# Patient Record
Sex: MALE | Race: WHITE
[De-identification: names, ages, dates, MRNs, and addresses within clinical notes are randomized per-mention and may not be internally consistent; named-entity substitution may affect disease eponyms.]

---

## 2019-03-11 ENCOUNTER — HOSPITAL ENCOUNTER (OUTPATIENT)
Dept: HOSPITAL 75 - LAB FS | Age: 29
End: 2019-03-11
Attending: FAMILY MEDICINE
Payer: COMMERCIAL

## 2019-03-11 DIAGNOSIS — K92.1: Primary | ICD-10-CM

## 2019-03-11 LAB
ERYTHROCYTE [DISTWIDTH] IN BLOOD BY AUTOMATED COUNT: 12.9 % (ref 10–14.5)
HCT VFR BLD CALC: 46 % (ref 40–54)
HGB BLD-MCNC: 15.1 G/DL (ref 13.3–17.7)
MCH RBC QN AUTO: 30 PG (ref 25–34)
MCHC RBC AUTO-ENTMCNC: 33 G/DL (ref 32–36)
MCV RBC AUTO: 90 FL (ref 80–99)
PLATELET # BLD: 348 10^3/UL (ref 130–400)
PMV BLD AUTO: 10.5 FL (ref 7.4–10.4)
WBC # BLD AUTO: 11.6 10^3/UL (ref 4.3–11)

## 2019-03-11 PROCEDURE — 85027 COMPLETE CBC AUTOMATED: CPT

## 2019-03-11 PROCEDURE — 36415 COLL VENOUS BLD VENIPUNCTURE: CPT

## 2020-12-14 ENCOUNTER — HOSPITAL ENCOUNTER (OUTPATIENT)
Dept: HOSPITAL 75 - LAB | Age: 30
LOS: 90 days | Discharge: HOME | End: 2021-03-14
Attending: UROLOGY
Payer: COMMERCIAL

## 2020-12-14 DIAGNOSIS — Z01.89: Primary | ICD-10-CM

## 2020-12-14 DIAGNOSIS — Z98.52: ICD-10-CM

## 2020-12-14 LAB — SPERM P VAS SMN QL MICRO: PRESENT

## 2020-12-14 PROCEDURE — 89321 SEMEN ANAL SPERM DETECTION: CPT

## 2022-07-15 ENCOUNTER — HOSPITAL ENCOUNTER (EMERGENCY)
Dept: HOSPITAL 75 - ER FS | Age: 32
Discharge: HOME | End: 2022-07-15
Payer: COMMERCIAL

## 2022-07-15 VITALS — HEIGHT: 68.9 IN | BODY MASS INDEX: 36.24 KG/M2 | WEIGHT: 244.71 LBS

## 2022-07-15 VITALS — DIASTOLIC BLOOD PRESSURE: 9 MMHG | SYSTOLIC BLOOD PRESSURE: 137 MMHG

## 2022-07-15 DIAGNOSIS — Z28.310: ICD-10-CM

## 2022-07-15 DIAGNOSIS — R07.2: Primary | ICD-10-CM

## 2022-07-15 DIAGNOSIS — F41.1: ICD-10-CM

## 2022-07-15 LAB
ALBUMIN SERPL-MCNC: 4.4 GM/DL (ref 3.2–4.5)
ALP SERPL-CCNC: 102 U/L (ref 40–136)
ALT SERPL-CCNC: 17 U/L (ref 0–55)
BASOPHILS # BLD AUTO: 0 10^3/UL (ref 0–0.1)
BASOPHILS NFR BLD AUTO: 0 % (ref 0–10)
BILIRUB SERPL-MCNC: 0.6 MG/DL (ref 0.1–1)
BUN/CREAT SERPL: 15
CALCIUM SERPL-MCNC: 9 MG/DL (ref 8.5–10.1)
CHLORIDE SERPL-SCNC: 105 MMOL/L (ref 98–107)
CO2 SERPL-SCNC: 22 MMOL/L (ref 21–32)
CREAT SERPL-MCNC: 0.93 MG/DL (ref 0.6–1.3)
EOSINOPHIL # BLD AUTO: 0.3 10^3/UL (ref 0–0.3)
EOSINOPHIL NFR BLD AUTO: 3 % (ref 0–10)
GFR SERPLBLD BASED ON 1.73 SQ M-ARVRAT: 113 ML/MIN
GLUCOSE SERPL-MCNC: 113 MG/DL (ref 70–105)
HCT VFR BLD CALC: 43 % (ref 40–54)
HGB BLD-MCNC: 14.5 G/DL (ref 13.3–17.7)
LIPASE SERPL-CCNC: 32 U/L (ref 8–78)
LYMPHOCYTES # BLD AUTO: 1.8 10^3/UL (ref 1–4)
LYMPHOCYTES NFR BLD AUTO: 19 % (ref 12–44)
MANUAL DIFFERENTIAL PERFORMED BLD QL: NO
MCH RBC QN AUTO: 29 PG (ref 25–34)
MCHC RBC AUTO-ENTMCNC: 34 G/DL (ref 32–36)
MCV RBC AUTO: 86 FL (ref 80–99)
MONOCYTES # BLD AUTO: 1 10^3/UL (ref 0–1)
MONOCYTES NFR BLD AUTO: 11 % (ref 0–12)
NEUTROPHILS # BLD AUTO: 6.2 10^3/UL (ref 1.8–7.8)
NEUTROPHILS NFR BLD AUTO: 66 % (ref 42–75)
PLATELET # BLD: 290 10^3/UL (ref 130–400)
PMV BLD AUTO: 10.3 FL (ref 9–12.2)
POTASSIUM SERPL-SCNC: 4 MMOL/L (ref 3.6–5)
PROT SERPL-MCNC: 6.7 GM/DL (ref 6.4–8.2)
SODIUM SERPL-SCNC: 138 MMOL/L (ref 135–145)
WBC # BLD AUTO: 9.5 10^3/UL (ref 4.3–11)

## 2022-07-15 PROCEDURE — 85025 COMPLETE CBC W/AUTO DIFF WBC: CPT

## 2022-07-15 PROCEDURE — 83690 ASSAY OF LIPASE: CPT

## 2022-07-15 PROCEDURE — 36415 COLL VENOUS BLD VENIPUNCTURE: CPT

## 2022-07-15 PROCEDURE — 84484 ASSAY OF TROPONIN QUANT: CPT

## 2022-07-15 PROCEDURE — 93005 ELECTROCARDIOGRAM TRACING: CPT

## 2022-07-15 PROCEDURE — 71045 X-RAY EXAM CHEST 1 VIEW: CPT

## 2022-07-15 PROCEDURE — 80053 COMPREHEN METABOLIC PANEL: CPT

## 2022-07-15 NOTE — ED CHEST PAIN
General


Chief Complaint:  Cardiac/General Problems


Stated Complaint:  CHEST PAIN


Nursing Triage Note:  


Pt c/o center chest pain since 9pm yesterday. Reports no relief from tums. Pt 


reports he also vomitted yesterday morning. Denies cardiac hx.


Source:  patient


Exam Limitations:  no limitations





History of Present Illness


Date Seen by Provider:  Jul 15, 2022


Time Seen by Provider:  03:29


Initial Comments


31-year-old male patient with history of anxiety complaining of nonexertional 

substernal aching pain that started at 2100 last night as a constant pain that 

getting worse with position.  Patient rated his pain 2- 3/10 and states the pain

getting worse with supine position.  Patient denies radiation of pain, shortness

of breath, diaphoresis, palpitation, paresthesia, nausea.  Patient took Tums 

without improvement of his pain.  Patient denies history of chest pain.  Patient

stated he had sore throat 5 days ago and diagnosed with tonsil stones at urgent 

care and treated with amoxicillin.  Patient complaining of 1 episode of vomiting

yesterday morning while he was at work and several episodes of diarrhea 

yesterday.





Allergies and Home Medications


Allergies


Coded Allergies:  


     No Known Allergies (Verified  Allergy, Unknown, 7/15/22)





Patient Home Medication List


Home Medication List Reviewed:  Yes





Review of Systems


Review of Systems


Constitutional:  see HPI


EENTM:  See HPI


Respiratory:  No Symptoms Reported


Cardiovascular:  See HPI


Gastrointestinal:  See HPI


Genitourinary:  No Symptoms Reported


Musculoskeletal:  no symptoms reported


Skin:  no symptoms reported


Psychiatric/Neurological:  See HPI


Endocrine:  No Symptoms Reported


Hematologic/Lymphatic:  No Symptoms Reported





All Other Systems Reviewed


Negative Unless Noted:  Yes





Past Medical-Social-Family Hx


Patient Social History


Tobacco Use?:  No


Use of E-Cig and/or Vaping dev:  No


Substance use?:  No


Alcohol Use?:  No


Pt feels they are or have been:  No





Immunizations Up To Date


First/Initial COVID19 Vaccinat:  Moderna


Second COVID19 Vaccination Juan:  Moderna





Physical Exam


Vital Signs





Vital Signs - First Documented








 7/15/22





 03:31


 


Temp 36.9


 


Pulse 70


 


Resp 18


 


B/P (MAP) 142/101 (115)


 


Pulse Ox 98


 


O2 Delivery Room Air





Capillary Refill : Less Than 3 Seconds


Height, Weight, BMI


Height: '"


Weight: lbs. oz. kg; 36.00 BMI


Method:


General Appearance:  No Apparent Distress, WD/WN, Anxious


HEENT:  PERRL/EOMI, TMs Normal


Neck:  Full Range of Motion, Normal Inspection


Respiratory:  Chest Non Tender, Lungs Clear, Normal Breath Sounds, No Accessory 

Muscle Use, No Respiratory Distress


Cardiovascular:  Regular Rate, Rhythm, No Edema, No Gallop


Gastrointestinal:  Normal Bowel Sounds, No Organomegaly


Extremity:  Normal Capillary Refill, Normal Inspection


Neurologic/Psychiatric:  Alert, Oriented x3, No Motor/Sensory Deficits


Skin:  Normal Color, Warm/Dry


Lymphatic:  No Adenopathy





Progress/Results/Core Measures


Results/Orders


Lab Results





Laboratory Tests








Test


 7/15/22


03:30 Range/Units


 


 


White Blood Count


 9.5 


 4.3-11.0


10^3/uL


 


Red Blood Count


 5.02 


 4.30-5.52


10^6/uL


 


Hemoglobin 14.5  13.3-17.7  g/dL


 


Hematocrit 43  40-54  %


 


Mean Corpuscular Volume 86  80-99  fL


 


Mean Corpuscular Hemoglobin 29  25-34  pg


 


Mean Corpuscular Hemoglobin


Concent 34 


 32-36  g/dL





 


Red Cell Distribution Width 13.0  10.0-14.5  %


 


Platelet Count


 290 


 130-400


10^3/uL


 


Mean Platelet Volume 10.3  9.0-12.2  fL


 


Immature Granulocyte % (Auto) 0   %


 


Neutrophils (%) (Auto) 66  42-75  %


 


Lymphocytes (%) (Auto) 19  12-44  %


 


Monocytes (%) (Auto) 11  0-12  %


 


Eosinophils (%) (Auto) 3  0-10  %


 


Basophils (%) (Auto) 0  0-10  %


 


Neutrophils # (Auto)


 6.2 


 1.8-7.8


10^3/uL


 


Lymphocytes # (Auto)


 1.8 


 1.0-4.0


10^3/uL


 


Monocytes # (Auto)


 1.0 


 0.0-1.0


10^3/uL


 


Eosinophils # (Auto)


 0.3 


 0.0-0.3


10^3/uL


 


Basophils # (Auto)


 0.0 


 0.0-0.1


10^3/uL


 


Immature Granulocyte # (Auto)


 0.0 


 0.0-0.1


10^3/uL


 


Sodium Level 138  135-145  MMOL/L


 


Potassium Level 4.0  3.6-5.0  MMOL/L


 


Chloride Level 105    MMOL/L


 


Carbon Dioxide Level 22  21-32  MMOL/L


 


Anion Gap 11  5-14  MMOL/L


 


Blood Urea Nitrogen 14  7-18  MG/DL


 


Creatinine


 0.93 


 0.60-1.30


MG/DL


 


Estimat Glomerular Filtration


Rate 113 


  





 


BUN/Creatinine Ratio 15   


 


Glucose Level 113 H   MG/DL


 


Calcium Level 9.0  8.5-10.1  MG/DL


 


Corrected Calcium 8.7  8.5-10.1  MG/DL


 


Total Bilirubin 0.6  0.1-1.0  MG/DL


 


Aspartate Amino Transf


(AST/SGOT) 16 


 5-34  U/L





 


Alanine Aminotransferase


(ALT/SGPT) 17 


 0-55  U/L





 


Alkaline Phosphatase 102    U/L


 


Troponin I < 0.30  <0.30  NG/ML


 


Total Protein 6.7  6.4-8.2  GM/DL


 


Albumin 4.4  3.2-4.5  GM/DL


 


Lipase 32  8-78  U/L








My Orders





Orders - MACKENZIE RAYMOND MD


Cbc With Automated Diff (7/15/22 03:40)


Chest 1 View Ap/Pa Only (7/15/22 03:40)


Ekg Tracing (7/15/22 03:40)


Comprehensive Metabolic Panel (7/15/22 03:40)


Ed Iv/Invasive Line Start (7/15/22 03:40)


Lipase (7/15/22 03:40)


Troponin I Fs (7/15/22 03:40)


Antacid  Suspension (Mylanta  Suspension (7/15/22 03:45)


Lidocaine 2% Viscous 15 Ml (Xylocaine Vi (7/15/22 03:45)


Lidocaine 2% Viscous 15 Ml (Xylocaine Vi (7/15/22 03:48)


Antacid  Suspension (Mylanta  Suspension (7/15/22 03:49)





Medications Given in ED





Current Medications








 Medications  Dose


 Ordered  Sig/Lala


 Route  Start Time


 Stop Time Status Last Admin


Dose Admin


 


 Al Hydrox/Mg


 Hydrox/Simethicone  30 ml  ONCE  ONCE


 PO  7/15/22 03:45


 7/15/22 03:46 DC 7/15/22 03:50


30 ML


 


 Lidocaine HCl  5 ml  ONCE  ONCE


 PO  7/15/22 03:45


 7/15/22 03:46 DC 7/15/22 03:50


15 ML








Vital Signs/I&O











 7/15/22





 03:31


 


Temp 36.9


 


Pulse 70


 


Resp 18


 


B/P (MAP) 142/101 (115)


 


Pulse Ox 98


 


O2 Delivery Room Air














Blood Pressure Mean:                    115











Progress


Progress Note :  


Progress Note


Evaluation patient in ER showed 4-year-old male patient with STONE score of 0 

presented with complaining of substernal chest pain for about 6 hours as a mild 

pain without other symptoms.  Patient had a diagnosis of recent tonsil infection

and treated with amoxicillin.  Patient complaining of several episodes of 

diarrhea and 1 episode of vomiting yesterday morning.  Patient has unremarkable 

physical exam.  CBC and CMP and troponin was unremarkable.  Chest x-ray did not 

show acute finding.  EKG did not show ST elevation.  Patient treated with GI 

cocktail but he states his pain did not change.  Patient did not want to have 

more pain medication and he stated he would take Tylenol at home.  Patient inf

ormed that the chest pain is most likely related to GI symptoms and side effect 

of amoxicillin.  Patient did not want to have pain medication for home.  Patient

advised to follow-up with his primary care physician in 3 to 5 days and return 

to ER as needed.


Initial ECG Impression Date:  Jul 15, 2022


Initial ECG Impression Time:  03:25


Initial ECG Rate:  70


Comment


EKG interpreted by me.  EKG at 0 325 showed normal sinus rhythm at rate of 70 

with multiple artifact, WV interval of 133 and QT interval of 392, possible 

right ventricular conduction delay, no acute ST and T wave elevation.


EKG :  


   EKG Time:  03:25





Departure


Impression





   Primary Impression:  


   Non-cardiac chest pain


   Additional Impression:  


   Anxiety about health


Disposition:  01 HOME, SELF-CARE


Condition:  Stable





Departure-Patient Inst.


Decision time for Depature:  04:22


Referrals:  


NO,LOCAL PHYSICIAN (PCP/Family)


Primary Care Physician


Patient Instructions:  Chest Pain That Is Not Caused by the Heart (DC)





Add. Discharge Instructions:  


Follow-up with your primary care physician in 3 to 5 days


Return to ER as needed


May take Tylenol or ibuprofen as needed for pain





All discharge instructions reviewed with patient and/or family. Voiced 

understanding.











MACKENZIE RAYMOND MD             Jul 15, 2022 04:13

## 2022-07-15 NOTE — DIAGNOSTIC IMAGING REPORT
EXAMINATION: Chest 1 view



HISTORY: Chest pain



COMPARISON: None available.



FINDINGS: 



Heart size and pulmonary vasculature are normal. The lungs are

clear without consolidation, pleural effusion, or pneumothorax.

The osseous structures are intact.



IMPRESSION: 



1. No acute radiographic abnormality in the chest.



Dictated by: 



  Dictated on workstation # [id number]

## 2023-10-25 ENCOUNTER — HOSPITAL ENCOUNTER (EMERGENCY)
Dept: HOSPITAL 75 - ER FS | Age: 33
Discharge: HOME | End: 2023-10-25
Payer: COMMERCIAL

## 2023-10-25 VITALS — WEIGHT: 253.53 LBS | BODY MASS INDEX: 37.55 KG/M2 | HEIGHT: 68.9 IN

## 2023-10-25 VITALS — SYSTOLIC BLOOD PRESSURE: 132 MMHG | DIASTOLIC BLOOD PRESSURE: 89 MMHG

## 2023-10-25 DIAGNOSIS — N13.2: Primary | ICD-10-CM

## 2023-10-25 LAB
ALBUMIN SERPL-MCNC: 4.6 GM/DL (ref 3.2–4.5)
ALP SERPL-CCNC: 98 U/L (ref 40–136)
ALT SERPL-CCNC: 19 U/L (ref 0–55)
APTT PPP: YELLOW S
BACTERIA #/AREA URNS HPF: NEGATIVE /HPF
BASOPHILS # BLD AUTO: 0.1 10^3/UL (ref 0–0.1)
BASOPHILS NFR BLD AUTO: 1 % (ref 0–10)
BILIRUB SERPL-MCNC: 0.4 MG/DL (ref 0.1–1)
BILIRUB UR QL STRIP: (no result)
BUN/CREAT SERPL: 13
CALCIUM SERPL-MCNC: 9.3 MG/DL (ref 8.5–10.1)
CHLORIDE SERPL-SCNC: 104 MMOL/L (ref 98–107)
CO2 SERPL-SCNC: 23 MMOL/L (ref 21–32)
CREAT SERPL-MCNC: 1.2 MG/DL (ref 0.6–1.3)
EOSINOPHIL # BLD AUTO: 0.2 10^3/UL (ref 0–0.3)
EOSINOPHIL NFR BLD AUTO: 2 % (ref 0–10)
FIBRINOGEN PPP-MCNC: (no result) MG/DL
GFR SERPLBLD BASED ON 1.73 SQ M-ARVRAT: 82 ML/MIN
GLUCOSE SERPL-MCNC: 142 MG/DL (ref 70–105)
GLUCOSE UR STRIP-MCNC: NEGATIVE MG/DL
HCT VFR BLD CALC: 43 % (ref 40–54)
HGB BLD-MCNC: 14.2 G/DL (ref 13.3–17.7)
KETONES UR QL STRIP: NEGATIVE
LEUKOCYTE ESTERASE UR QL STRIP: NEGATIVE
LIPASE SERPL-CCNC: 25 U/L (ref 8–78)
LYMPHOCYTES # BLD AUTO: 2.6 10^3/UL (ref 1–4)
LYMPHOCYTES NFR BLD AUTO: 22 % (ref 12–44)
MANUAL DIFFERENTIAL PERFORMED BLD QL: NO
MCH RBC QN AUTO: 29 PG (ref 25–34)
MCHC RBC AUTO-ENTMCNC: 33 G/DL (ref 32–36)
MCV RBC AUTO: 89 FL (ref 80–99)
MONOCYTES # BLD AUTO: 0.6 10^3/UL (ref 0–1)
MONOCYTES NFR BLD AUTO: 5 % (ref 0–12)
NEUTROPHILS # BLD AUTO: 8 10^3/UL (ref 1.8–7.8)
NEUTROPHILS NFR BLD AUTO: 70 % (ref 42–75)
NITRITE UR QL STRIP: NEGATIVE
PH UR STRIP: 5.5 [PH] (ref 5–9)
PLATELET # BLD: 311 10^3/UL (ref 130–400)
PMV BLD AUTO: 9.9 FL (ref 9–12.2)
POTASSIUM SERPL-SCNC: 4.3 MMOL/L (ref 3.6–5)
PROT SERPL-MCNC: 7.2 GM/DL (ref 6.4–8.2)
PROT UR QL STRIP: (no result)
SODIUM SERPL-SCNC: 139 MMOL/L (ref 135–145)
SP GR UR STRIP: >=1.03 (ref 1.02–1.02)
SQUAMOUS #/AREA URNS HPF: (no result) /HPF
WBC # BLD AUTO: 11.5 10^3/UL (ref 4.3–11)
WBC #/AREA URNS HPF: (no result) /HPF

## 2023-10-25 PROCEDURE — 83690 ASSAY OF LIPASE: CPT

## 2023-10-25 PROCEDURE — 80053 COMPREHEN METABOLIC PANEL: CPT

## 2023-10-25 PROCEDURE — 74176 CT ABD & PELVIS W/O CONTRAST: CPT

## 2023-10-25 PROCEDURE — 85025 COMPLETE CBC W/AUTO DIFF WBC: CPT

## 2023-10-25 PROCEDURE — 81000 URINALYSIS NONAUTO W/SCOPE: CPT

## 2023-10-25 PROCEDURE — 36415 COLL VENOUS BLD VENIPUNCTURE: CPT

## 2023-10-25 NOTE — DIAGNOSTIC IMAGING REPORT
PROCEDURE: CT abdomen and pelvis without contrast.



TECHNIQUE: Multiple contiguous axial images were obtained through

the abdomen and pelvis without the use of intravenous contrast.

Auto Exposure Controls were utilized during the CT exam to meet

ALARA standards for radiation dose reduction. 



INDICATION:  Right-sided abdominal pain, renal stones



COMPARISON: None available 



FINDINGS: The visualized lung bases are clear. The unenhanced

liver and spleen are unremarkable. The adrenal glands are

unremarkable. The pancreas is unremarkable. The gallbladder is

unremarkable. Punctate 1 mm nonobstructing left renal calculus.

Otherwise, the left kidney and left ureter are unremarkable. A 3

mm calculus is identified within the right ureterovesicular

junction resulting in mild right hydroureteronephrosis.

Additional small calculus noted within the inferior pole of the

right kidney. No aneurysmal dilatation of the abdominal aorta.

Tiny fat-containing umbilical hernia. The urinary bladder is

decompressed, therefore not well evaluated. Small fat-containing

bilateral inguinal hernias. Surgical clips within the

scrotum/inguinal canals bilaterally. Prior appendectomy. No bowel

obstruction or pneumatosis. No significant adenopathy, free air,

or free fluid within the abdomen or pelvis. No acute osseous

abnormality.



IMPRESSION: 3 mm stone within the right ureterovesicular junction

resulting in mild right hydroureteronephrosis.



Tiny bilateral renal calculi, nonobstructing on the left.



Small fat-containing bilateral inguinal hernias and umbilical

hernia.



Additional findings as above.



Dictated by: 



  Dictated on workstation # [id number]

## 2023-10-25 NOTE — ED ABDOMINAL PAIN
General


Chief Complaint:  Abdominal/GI Problems


Stated Complaint:  ABD PAIN|RIGHT SIDE


Nursing Triage Note:  


ARRIVED VIA AMB TO ROOM 06 WITH COMPLAINTS OF RIGHT MID/SIDE ABD PAIN STARTING 


AT 0400.  PT ALSO COMPLAINS OF VOMITING X2.


Source of Information:  Patient





History of Present Illness


Date Seen by Provider:  Oct 25, 2023


Time Seen by Provider:  07:31


Initial Comments


33-year-old male presenting with complaints of sudden onset of right flank pain.

 He states that he woke up at 4 AM with the pain.  He has had 2 episodes of 

vomiting with the pain.  He has not taken anything for the pain because of the 

nausea and vomiting.  He had presented to urgent care and they had told him that

they could not do anything for him and to go to the ER.  He states he does have 

a history of kidney stone on the left side but that it was causing him pain more

in his back and this felt different.  He denied any fever, chills, pain with 

urination.  He has had no change in his bowels.


Timing/Duration:  1-3 Hours


Severity/Quality:  Severe, Sharp, Stabbing


Location:  Flank (Right side of his flank)


Activities at Onset:  Sleeping


Modifying Factors:  Worsens With Movement, Worsens With Palpation


Associated Symptoms:  No Back Pain, No Chest Pain, No Diaphoresis, No 

Fever/Chills, No Fatigue, No Headache, No Heartburn; Nausea/Vomiting; No Rash, 

No Shortness of Air, No Swelling/Mass in Abdomen, No Syncope, No Weakness





Allergies and Home Medications


Allergies


Coded Allergies:  


     No Known Allergies (Verified  Allergy, Unknown, 7/15/22)





Patient Home Medication List


Home Medication List Reviewed:  Yes


Hydrocodone/Acetaminophen (Hydrocodone-Acetamin 5-325 mg) 5 Mg-325 Mg Tablet, 1 

TAB PO Q4H PRN for PAIN SEVERE


   Prescribed by: CRISTIAN ALBERTORT on 10/25/23 0839


Ondansetron (Ondansetron Odt) 4 Mg Tab.rapdis, 4 MG PO Q6H PRN for 

NAUSEA/VOMITING


   Prescribed by: CRISTIAN ALBERTORT on 10/25/23 08


Tamsulosin HCl (Flomax) 0.4 Mg Cap, 0.4 MG PO DAILY


   Prescribed by: CRISTIAN ALBERTORT on 10/25/23 0838





Review of Systems


Review of Systems


Constitutional:  No chills, No fever


EENTM:  No Symptoms Reported


Respiratory:  No Symptoms Reported


Cardiovascular:  No Symptoms Reported


Gastrointestinal:  See HPI


Genitourinary:  See HPI


Musculoskeletal:  no symptoms reported


Skin:  no symptoms reported


Psychiatric/Neurological:  No Symptoms Reported





Past Medical-Social-Family Hx


Patient Social History


Tobacco Use?:  No


Substance use?:  No


Alcohol Use?:  No





Immunizations Up To Date


First/Initial COVID19 Vaccinat:  Moderna


Second COVID19 Vaccination Juan:  Moderna





Past Medical History


Surgery/Hospitalization HX:  


Kidney stones, depression/anxiety





Physical Exam


Vital Signs





Vital Signs - First Documented








 10/25/23





 07:30


 


Temp 36.5


 


Pulse 78


 


Resp 16


 


B/P (MAP) 175/111 (132)


 


Pulse Ox 99


 


O2 Delivery Room Air





Capillary Refill :


Height/Weight/BMI


Height: '"


Weight: lbs. oz. kg; 37.00 BMI


Method:


General Appearance:  WD/WN, no apparent distress, obese


Respiratory:  chest non-tender, lungs clear, normal breath sounds


Cardiovascular:  normal peripheral pulses, regular rate, rhythm


Gastrointestinal:  normal bowel sounds, non tender, soft, no pulsatile mass, 

other (Complains of pain to the right flank but not tender to palpation)


Rectal:  deferred


Extremities:  normal range of motion, non-tender, normal capillary refill


Back:  no CVA tenderness


Neurologic/Psychiatric:  alert, oriented x 3


Skin:  normal color, warm/dry





Progress/Results/Core Measures


Results/Orders


Lab Results





Laboratory Tests








Test


 10/25/23


07:32 10/25/23


07:40 Range/Units


 


 


Urine Color YELLOW    


 


Urine Clarity SL CLOUDY    


 


Urine pH 5.5   5-9  


 


Urine Specific Gravity >=1.030   1.016-1.022  


 


Urine Protein 1+ H  NEGATIVE  


 


Urine Glucose (UA) NEGATIVE   NEGATIVE  


 


Urine Ketones NEGATIVE   NEGATIVE  


 


Urine Nitrite NEGATIVE   NEGATIVE  


 


Urine Bilirubin 1+ H  NEGATIVE  


 


Urine Urobilinogen 0.2   < = 1.0  MG/DL


 


Urine Leukocyte Esterase NEGATIVE   NEGATIVE  


 


Urine RBC (Auto) 3+ H  NEGATIVE  


 


Urine RBC 10-25 H   /HPF


 


Urine WBC NONE    /HPF


 


Urine Squamous Epithelial


Cells NONE 


 


  /HPF





 


Urine Crystals NONE    /LPF


 


Urine Bacteria NEGATIVE    /HPF


 


Urine Casts NONE    /LPF


 


Urine Mucus SMALL H   /LPF


 


Urine Culture Indicated NO    


 


White Blood Count


 


 11.5 H


 4.3-11.0


10^3/uL


 


Red Blood Count


 


 4.87 


 4.30-5.52


10^6/uL


 


Hemoglobin  14.2  13.3-17.7  g/dL


 


Hematocrit  43  40-54  %


 


Mean Corpuscular Volume  89  80-99  fL


 


Mean Corpuscular Hemoglobin  29  25-34  pg


 


Mean Corpuscular Hemoglobin


Concent 


 33 


 32-36  g/dL





 


Red Cell Distribution Width  13.6  10.0-14.5  %


 


Platelet Count


 


 311 


 130-400


10^3/uL


 


Mean Platelet Volume  9.9  9.0-12.2  fL


 


Immature Granulocyte % (Auto)  1   %


 


Neutrophils (%) (Auto)  70  42-75  %


 


Lymphocytes (%) (Auto)  22  12-44  %


 


Monocytes (%) (Auto)  5  0-12  %


 


Eosinophils (%) (Auto)  2  0-10  %


 


Basophils (%) (Auto)  1  0-10  %


 


Neutrophils # (Auto)


 


 8.0 H


 1.8-7.8


10^3/uL


 


Lymphocytes # (Auto)


 


 2.6 


 1.0-4.0


10^3/uL


 


Monocytes # (Auto)


 


 0.6 


 0.0-1.0


10^3/uL


 


Eosinophils # (Auto)


 


 0.2 


 0.0-0.3


10^3/uL


 


Basophils # (Auto)


 


 0.1 


 0.0-0.1


10^3/uL


 


Immature Granulocyte # (Auto)


 


 0.1 


 0.0-0.1


10^3/uL


 


Sodium Level  139  135-145  MMOL/L


 


Potassium Level  4.3  3.6-5.0  MMOL/L


 


Chloride Level  104    MMOL/L


 


Carbon Dioxide Level  23  21-32  MMOL/L


 


Anion Gap  12  5-14  MMOL/L


 


Blood Urea Nitrogen  15  7-18  MG/DL


 


Creatinine


 


 1.20 


 0.60-1.30


MG/DL


 


Estimat Glomerular Filtration


Rate 


 82 


  





 


BUN/Creatinine Ratio  13   


 


Glucose Level  142 H   MG/DL


 


Calcium Level  9.3  8.5-10.1  MG/DL


 


Corrected Calcium    8.5-10.1  MG/DL


 


Total Bilirubin  0.4  0.1-1.0  MG/DL


 


Aspartate Amino Transf


(AST/SGOT) 


 19 


 5-34  U/L





 


Alanine Aminotransferase


(ALT/SGPT) 


 19 


 0-55  U/L





 


Alkaline Phosphatase  98    U/L


 


Total Protein  7.2  6.4-8.2  GM/DL


 


Albumin  4.6 H 3.2-4.5  GM/DL


 


Lipase  25  8-78  U/L








My Orders





Orders - ENYART,CRISTIAN E MD


Comprehensive Metabolic Panel (10/25/23 07:38)


Lipase (10/25/23 07:38)


Ua Culture If Indicated (10/25/23 07:38)


Ed Iv/Invasive Line Start (10/25/23 07:38)


Cbc And Automated Diff (10/25/23 07:38)


Ct Abdomen/Pelvis Wo (10/25/23 07:38)


Ns Iv 1000 Ml (Ns Iv 1000 Ml) (10/25/23 07:38)


Ondansetron Injection (Ondansetron  Inj (10/25/23 07:38)


Ketorolac Injection (Ketorolac Injection (10/25/23 07:38)


Iohexol Injection (Omnipaque 350 Mg/Ml 1 (10/25/23 07:45)


Received Contrast (Hold Metformin- Contr (10/25/23 07:45)


Sodium Chloride Flush (Catheter Flush Sy (10/25/23 07:45)


Ns (Ivpb) 100 Ml (Sodium Chloride 0.9% 1 (10/25/23 07:45)


Tamsulosin Capsule (Flomax Capsule) (10/25/23 08:39)


Ketorolac Injection (Ketorolac Injection (10/25/23 08:39)


Strain Urine (10/25/23 08:39)





Vital Signs/I&O











 10/25/23 10/25/23





 07:30 08:59


 


Temp 36.5 


 


Pulse 78 87


 


Resp 16 16


 


B/P (MAP) 175/111 (132) 132/89


 


Pulse Ox 99 98


 


O2 Delivery Room Air 














Blood Pressure Mean:                    132











Progress


Progress Note #1:  


Progress Note


Differential diagnosis includes kidney stone, colitis, diverticulitis, muscle 

strain, gastroenteritis, pyelonephritis, cystitis.





Establish peripheral IV access and send labs for complete blood count, 

comprehensive metabolic profile, lipase.  Urinalysis to look for signs of pr

ovide her urine infection.  CT scan of the abdomen and pelvis without IV 

contrast to look for possible kidney stone or pathology to be causing sudden 

onset of right flank pain.  Administer normal saline 1 L IV fluid bolus for 

hydration, Toradol 15 mg IV for pain, Zofran 4 mg IV for nausea and vomiting.


Progress Note #2:  


   Time:  08:14


Progress Note


Complete blood count does not show an elevated white blood cell count for 

infection.  The urinalysis was concentrated with specific gravity greater than 

1.030 and had 3+ blood present.  On my personal review and interpretation of the

CT scan of the abdomen pelvis without IV contrast he had a 3 mm kidney stone 

with hydroureter ureter and mild hydronephrosis on the right side.  The kidney 

stone is at the UVJ.  Patient's pain was improved with treatment.  His blood 

pressure had improved to 139/84 worse initially it was 175/111.  Awaiting 

comprehensive metabolic profile and official reading on the CT.


Progress Note #3:  


   Time:  08:32


Progress Note


Radiologist reading on the CT scan of the abdomen pelvis without IV contrast 

showed a 3 mm stone with mild hydroureter on the right side.  The stone is at 

the UVJ.  He also had small bilateral fat containing inguinal hernias.  There 

are some small stones in both kidneys.  His comprehensive metabolic profile had 

a creatinine of 1.2 and mild elevation of his glucose to go along with having 

pain.  He did not have any elevation of his liver enzymes were basic 

electrolytes otherwise.  I reviewed with the patient the findings and will 

continue with the plan of using Flomax as well as straining his urine.  Continue

with some hydrocodone for severe pain and Zofran to help keep his stomach 

settled.  He states that he had seen Dr. Burleson out of [place] previously when

he had to have a kidney stone broken up.  Advised that he could check back with 

Dr. Burleson or Dr. Walsh will be starting to see patients in [place] in 

November.





Diagnostic Imaging





   Diagonstic Imaging:  CT


   Plain Films/CT/US/NM/MRI:  abdomen, pelvis


Comments


NAME:   TANMAY BARNES T II


MED REC#:   [id number]


ACCOUNT#:   [id number]


PT STATUS:   REG ER


:   1990


PHYSICIAN:   CRISTIAN CABRERA MD


ADMIT DATE:   10/25/23/ER FS


                                   ***Draft***


Date of Exam:10/25/23





CT ABDOMEN/PELVIS WO








PROCEDURE: CT abdomen and pelvis without contrast.





TECHNIQUE: Multiple contiguous axial images were obtained through


the abdomen and pelvis without the use of intravenous contrast.


Auto Exposure Controls were utilized during the CT exam to meet


ALARA standards for radiation dose reduction. 





INDICATION:  Right-sided abdominal pain, renal stones





COMPARISON: None available 





FINDINGS: The visualized lung bases are clear. The unenhanced


liver and spleen are unremarkable. The adrenal glands are


unremarkable. The pancreas is unremarkable. The gallbladder is


unremarkable. Punctate 1 mm nonobstructing left renal calculus.


Otherwise, the left kidney and left ureter are unremarkable. A 3


mm calculus is identified within the right ureterovesicular


junction resulting in mild right hydroureteronephrosis.


Additional small calculus noted within the inferior pole of the


right kidney. No aneurysmal dilatation of the abdominal aorta.


Tiny fat-containing umbilical hernia. The urinary bladder is


decompressed, therefore not well evaluated. Small fat-containing


bilateral inguinal hernias. Surgical clips within the


scrotum/inguinal canals bilaterally. Prior appendectomy. No bowel


obstruction or pneumatosis. No significant adenopathy, free air,


or free fluid within the abdomen or pelvis. No acute osseous


abnormality.





IMPRESSION: 3 mm stone within the right ureterovesicular junction


resulting in mild right hydroureteronephrosis.





Tiny bilateral renal calculi, nonobstructing on the left.





Small fat-containing bilateral inguinal hernias and umbilical


hernia.





Additional findings as above.





  Dictated on workstation # [id number]








Dict:   10/25/23 0811


Trans:   10/25/23 0824


[facility] [id number]





Interpreted by:     DAVID SAWYER MD


Electronically signed by:


   Reviewed:  Reviewed by Me





Departure


Impression





   Primary Impression:  


   Right distal ureteral calculus


   Additional Impressions:  


   Acute right flank pain


   Renal colic on right side


Disposition:  01 HOME, SELF-CARE


Condition:  Improved





Departure-Patient Inst.


Decision time for Depature:  08:36


Referrals:  


SHERRY GRIFFITH MD, J MARK MD





NO,LOCAL PHYSICIAN (PCP)


Primary Care Physician


Patient Instructions:  Flank Pain ED, Kidney Stone, Adult ED, Kidney Stone Diet,

How to Strain Your Urine





Add. Discharge Instructions:  


Try to stay well-hydrated and keep sipping on fluids to help flush out the 

kidney stone.





Use the nausea medicine to help keep your stomach settled.


Take the pain medicine to help keep your pain down to a tolerable level.





Strain your urine to see when the stone may pass. 





If having contined pain or more problems may check back with Primary care or see

Urologist. Dr. Walsh is a Urologist that will see patients in [place] 

starting in November. You  may also check back with Dr. Burleson from [facility] out of [place]. He does come to [place] to see patients as well and 

you may call his office at [phone]





All discharge instructions reviewed with patient and/or family. Voiced 

understanding.


Scripts


Hydrocodone/Acetaminophen (Hydrocodone-Acetamin 5-325 mg) 5 Mg-325 Mg Tablet


1 TAB PO Q4H PRN for PAIN SEVERE for 3 Days, #18 TAB 0 Refills


   Prov: CRISTIAN CABRERA MD         10/25/23 


Ondansetron (Ondansetron Odt) 4 Mg Tab.rapdis


4 MG PO Q6H PRN for NAUSEA/VOMITING for 3 Days, #12 TAB 0 Refills


   Prov: CRISTIAN CABRERA MD         10/25/23 


Tamsulosin HCl (Flomax) 0.4 Mg Cap


0.4 MG PO DAILY for renal colic for 5 Days, #5 CAP 0 Refills


   Prov: CRISTIAN CABRERA MD         10/25/23











CRISTIAN CABRERA MD               Oct 25, 2023 07:44